# Patient Record
Sex: FEMALE | Race: OTHER | NOT HISPANIC OR LATINO
[De-identification: names, ages, dates, MRNs, and addresses within clinical notes are randomized per-mention and may not be internally consistent; named-entity substitution may affect disease eponyms.]

---

## 2018-11-21 ENCOUNTER — APPOINTMENT (OUTPATIENT)
Dept: ORTHOPEDIC SURGERY | Facility: CLINIC | Age: 23
End: 2018-11-21
Payer: COMMERCIAL

## 2018-11-21 VITALS
BODY MASS INDEX: 30.56 KG/M2 | HEIGHT: 64 IN | SYSTOLIC BLOOD PRESSURE: 123 MMHG | DIASTOLIC BLOOD PRESSURE: 85 MMHG | HEART RATE: 68 BPM | WEIGHT: 179 LBS

## 2018-11-21 DIAGNOSIS — M21.6X2 OTHER ACQUIRED DEFORMITIES OF LEFT FOOT: ICD-10-CM

## 2018-11-21 DIAGNOSIS — Q74.2 OTHER CONGENITAL MALFORMATIONS OF LOWER LIMB(S), INCLUDING PELVIC GIRDLE: ICD-10-CM

## 2018-11-21 DIAGNOSIS — Z78.9 OTHER SPECIFIED HEALTH STATUS: ICD-10-CM

## 2018-11-21 PROCEDURE — 99203 OFFICE O/P NEW LOW 30 MIN: CPT

## 2018-11-21 PROCEDURE — 73630 X-RAY EXAM OF FOOT: CPT | Mod: LT

## 2019-06-11 ENCOUNTER — RESULT REVIEW (OUTPATIENT)
Age: 24
End: 2019-06-11

## 2020-03-22 ENCOUNTER — TRANSCRIPTION ENCOUNTER (OUTPATIENT)
Age: 25
End: 2020-03-22

## 2020-07-16 ENCOUNTER — RESULT REVIEW (OUTPATIENT)
Age: 25
End: 2020-07-16

## 2020-07-16 ENCOUNTER — TRANSCRIPTION ENCOUNTER (OUTPATIENT)
Age: 25
End: 2020-07-16

## 2021-01-31 ENCOUNTER — TRANSCRIPTION ENCOUNTER (OUTPATIENT)
Age: 26
End: 2021-01-31

## 2021-02-21 ENCOUNTER — TRANSCRIPTION ENCOUNTER (OUTPATIENT)
Age: 26
End: 2021-02-21

## 2021-04-05 ENCOUNTER — TRANSCRIPTION ENCOUNTER (OUTPATIENT)
Age: 26
End: 2021-04-05

## 2021-07-21 ENCOUNTER — TRANSCRIPTION ENCOUNTER (OUTPATIENT)
Age: 26
End: 2021-07-21

## 2022-03-01 ENCOUNTER — APPOINTMENT (OUTPATIENT)
Dept: ORTHOPEDIC SURGERY | Facility: CLINIC | Age: 27
End: 2022-03-01
Payer: COMMERCIAL

## 2022-03-01 PROCEDURE — 99203 OFFICE O/P NEW LOW 30 MIN: CPT

## 2022-03-01 PROCEDURE — 73562 X-RAY EXAM OF KNEE 3: CPT | Mod: 26,LT

## 2022-03-26 ENCOUNTER — TRANSCRIPTION ENCOUNTER (OUTPATIENT)
Age: 27
End: 2022-03-26

## 2022-04-18 ENCOUNTER — APPOINTMENT (OUTPATIENT)
Dept: MRI IMAGING | Facility: CLINIC | Age: 27
End: 2022-04-18

## 2022-04-29 ENCOUNTER — APPOINTMENT (OUTPATIENT)
Dept: ORTHOPEDIC SURGERY | Facility: CLINIC | Age: 27
End: 2022-04-29
Payer: COMMERCIAL

## 2022-04-29 VITALS
BODY MASS INDEX: 30.73 KG/M2 | SYSTOLIC BLOOD PRESSURE: 137 MMHG | HEART RATE: 71 BPM | HEIGHT: 64 IN | DIASTOLIC BLOOD PRESSURE: 91 MMHG | WEIGHT: 180 LBS

## 2022-04-29 PROCEDURE — 99213 OFFICE O/P EST LOW 20 MIN: CPT

## 2022-04-29 NOTE — PHYSICAL EXAM
[LE] : Sensory: Intact in bilateral lower extremities [Knee] : patellar 2+ and symmetric bilaterally [PT] : posterior tibial 2+ and symmetric bilaterally [de-identified] : Pt is a pleasant 26 year old female in NAD and AAOx3. 30 BMI. The pt has a mildly antalgic gait. Physical examination of both knees reveals normal contours, skin intact with no signs of infection, no erythema, no swelling, no effusion, no distal lymphedema or phlebitis, no patholaxity. ROM of the Left knee reveals 0°-120° Some discomfort with terminal flexion. Strength is 5/5 within this arc of motion. There is no pain on palpation to the medial/lateral joint line. There are no neurological deficits.+ Anterior drawer test. Negative Lachman. Negative Pivot shift\par \par KT-1000\par Right knee 8.5mm\par Left knee 12mm [de-identified] : Xray ([facility] PACS 3/1/22): interpreted by me today: Three views of the left knee demonstrate no acute fracture or dislocation.\par \par MRI L knee ([facility] 4/21/22): Partial tear of the ACL, midsubstance

## 2022-04-29 NOTE — HISTORY OF PRESENT ILLNESS
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 She was seen by Mr Irvin Samuels and had x-rays and MRI done. She was advised to follow up with a surgeon and is here today for consult. She states that the pain is a constant 2/10, which she does not take any pain meds. The pain does keep her from sleeping well. She states the knee does buckle on her randomly. She has a different gait due to the pain in the knee and she favors the right leg. She denies any numbness or tingling.

## 2022-04-29 NOTE — CONSULT LETTER
[Dear  ___] : Dear  [unfilled], [DrClau  ___] : Dr. ESCAMILLA [FreeTextEntry1] : I had the pleasure of evaluating your patient in the office today for complaints of left knee pain secondary to partial ACL tear. I have enclosed a copy of today's office notes for your charts and for your review. The patient will need documentation of medical clearance if she chooses to pursue surgical intervention. \par \par Sincerely, \par \par Enrique Payne M.D.\par Professor and \par Department of Orthopedic Surgery\par [facility] [place]\par

## 2022-04-29 NOTE — REASON FOR VISIT
[Initial Visit] : an initial visit for [FreeTextEntry2] : Left knee pain. Referred by Dr. Ramsey Medellin

## 2022-04-29 NOTE — DISCUSSION/SUMMARY
[Surgical risks reviewed] : Surgical risks reviewed [de-identified] : Pt is a pleasant 26 year old female with left knee pain secondary to partial thickness ACL tear.  A lengthy discussion was held regarding the patients condition and treatment options including all risks, benefits, prognosis and outcomes of each were discussed in detail. The patient is to discuss surgery with her family. She was given a physical therapy prescription at today's visit. The patient will contact me if there are any concerns. She will call the office with her decision of whether or not she wants to pursue surgery. The patient expressed understanding and all questions were answered.\par

## 2022-09-12 ENCOUNTER — NON-APPOINTMENT (OUTPATIENT)
Age: 27
End: 2022-09-12

## 2022-09-12 ENCOUNTER — APPOINTMENT (OUTPATIENT)
Dept: PULMONOLOGY | Facility: CLINIC | Age: 27
End: 2022-09-12

## 2022-09-12 DIAGNOSIS — Z88.9 ALLERGY STATUS TO UNSPECIFIED DRUGS, MEDICAMENTS AND BIOLOGICAL SUBSTANCES: ICD-10-CM

## 2022-09-12 DIAGNOSIS — Z82.5 FAMILY HISTORY OF ASTHMA AND OTHER CHRONIC LOWER RESPIRATORY DISEASES: ICD-10-CM

## 2022-09-12 DIAGNOSIS — M21.42 FLAT FOOT [PES PLANUS] (ACQUIRED), LEFT FOOT: ICD-10-CM

## 2022-09-12 DIAGNOSIS — Z83.3 FAMILY HISTORY OF DIABETES MELLITUS: ICD-10-CM

## 2022-09-12 DIAGNOSIS — Z82.49 FAMILY HISTORY OF ISCHEMIC HEART DISEASE AND OTHER DISEASES OF THE CIRCULATORY SYSTEM: ICD-10-CM

## 2022-09-12 DIAGNOSIS — Z87.2 PERSONAL HISTORY OF DISEASES OF THE SKIN AND SUBCUTANEOUS TISSUE: ICD-10-CM

## 2022-09-12 DIAGNOSIS — U07.1 COVID-19: ICD-10-CM

## 2022-09-12 PROCEDURE — 71046 X-RAY EXAM CHEST 2 VIEWS: CPT

## 2022-09-12 PROCEDURE — 94010 BREATHING CAPACITY TEST: CPT

## 2022-09-12 PROCEDURE — 99204 OFFICE O/P NEW MOD 45 MIN: CPT | Mod: 25

## 2022-09-12 PROCEDURE — 94618 PULMONARY STRESS TESTING: CPT

## 2022-09-12 RX ORDER — OLOPATADINE HYDROCHLORIDE 665 UG/1
0.6 SPRAY, METERED NASAL
Qty: 3 | Refills: 0 | Status: ACTIVE | COMMUNITY
Start: 2022-09-12 | End: 1900-01-01

## 2022-09-12 RX ORDER — OLOPATADINE HYDROCHLORIDE 2 MG/ML
0.2 SOLUTION OPHTHALMIC
Qty: 3 | Refills: 1 | Status: ACTIVE | COMMUNITY
Start: 2022-09-12 | End: 1900-01-01

## 2022-09-13 NOTE — REASON FOR VISIT
[Initial] : an initial visit [TextBox_44] : SOB, likely mild and intermittent asthma , allergies, eczema

## 2022-09-13 NOTE — PROCEDURE
[FreeTextEntry1] : CXR reveals a normal sized heart; no evidence of infiltrate or effusion--a normal appearing chest radiograph \par \par PFT reveals normal flows, with an FEV1 of 2.90 L, which is 105% of predicted, with a normal flow volume loop. \par \par 6 minute walk test reveals a low saturation of 96% with very slight dyspnea or fatigue; walked 635.7 meters. Pt stated mild SOB\par \par FENO was 18; a normal value being less than 25\par Fractional exhaled nitric oxide (FENO) is regarded as a simple, noninvasive method for assessing eosinophilic airway inflammation. Produced by a variety of cells within the lung, nitric oxide (NO) concentrations are generally low in healthy individuals. However, high concentrations of NO appear to be involved in nonspecific host defense mechanisms and chronic inflammatory diseases such as asthma. The American Thoracic Society (ATS) therefore has recommended using FENO to aid in the diagnosis and monitoring of eosinophilic airway inflammation and asthma, and for identifying steroid responsive individuals whose chronic respiratory symptoms may be caused by airway inflammation.

## 2022-09-13 NOTE — HISTORY OF PRESENT ILLNESS
[TextBox_4] : Ms. LEOS  is a 27 year old female with a history of ACL tear, allergies, Covid 19, eczema, who now comes to the office for an initial pulmonary evaluation. Her chief complaint is\par \par -she notes allergy symptoms are itchy eyes, productive cough and nasal congestion \par -she notes mild eczema \par -she notes nasal congestion in the summer\par -she notes currently on allergy shots \par -she notes intermittent SOB due to nasal congestion \par -she notes intermittent chest pain and congestion due to coughing\par -she notes bowels are good and regular   \par -she notes weight is stable  \par -she notes snoring onset 2 years \par  -she denies restful sleep\par -she notes she is able to fall asleep while watching TV\par -she notes her memory is stable \par -she notes concentration is below baseline\par -she notes she is able to fall asleep as a passenger in car \par -she notes bowels are good and regular \par -she notes vision is stable \par -she notes vegetarian \par -she notes mild upper back pain \par -she notes globus \par -she denies epistaxis \par \par -she denies any arthralgias, chest pain, chest pressure, chills, constipation, diarrhea, dizziness, dysphagia, fever, headaches, heartburn, itchy eyes, itchy ears, leg swelling, leg pain, myalgias, nausea, reflux, sweats, vomiting, wheezing or sour taste in the mouth.

## 2022-09-13 NOTE — PHYSICAL EXAM
[No Acute Distress] : no acute distress [Normal Oropharynx] : normal oropharynx [III] : Mallampati Class: III [Normal Appearance] : normal appearance [No Neck Mass] : no neck mass [Normal Rate/Rhythm] : normal rate/rhythm [Normal S1, S2] : normal s1, s2 [No Murmurs] : no murmurs [No Resp Distress] : no resp distress [Clear to Auscultation Bilaterally] : clear to auscultation bilaterally [No Abnormalities] : no abnormalities [Benign] : benign [Normal Gait] : normal gait [No Clubbing] : no clubbing [No Cyanosis] : no cyanosis [No Edema] : no edema [FROM] : FROM [Normal Color/ Pigmentation] : normal color/ pigmentation [No Focal Deficits] : no focal deficits [Oriented x3] : oriented x3 [Normal Affect] : normal affect [TextBox_2] : ow [TextBox_54] : 2/6 systolic murmur  [TextBox_68] : I:E ratio 1:3; clear

## 2022-09-13 NOTE — ASSESSMENT
[FreeTextEntry1] : Ms. LEOS  is a 27 year old female with a history of ACL tear, allergies, Covid 19, eczema, who now comes to the office for an initial pulmonary evaluation for SOB, likely mild and intermittent asthma , allergies, eczema \par \par Her shortness of breath is multifactorial due to:\par -poor mechanics of breathing \par -out of shape \par -over weight \par -pulmonary disease\par   -mild and intermittent asthma \par -cardiac disease \par \par problem 1:\par -add Symbicort 160 2 inhalations BID \par Asthma is believed to be caused by inherited (genetic) and environmental factor, but its exact cause is unknown. Asthma may be triggered by allergens, lung infections, or irritants in the air. Asthma triggers are different for each person. \par -Inhaler technique reviewed as well as oral hygiene techniques reviewed with patient. Avoidance of cold air, extremes of temperature; rescue inhaler should be used before exercise. Order of medication reviewed with patient. Recommended use of a cool mist humidifier in the bedroom. \par \par problem 2: allergies/ sinus\par -add Olopatadine 0.6% 1 sniff BID \par -add Olopatadine 0.2% eye drops, one drop in each eye BID \par -Environmental measures for allergies were encouraged including mattress and pillow covers, air purifier, and environmental controls. \par \par Problem 3:eczema\par -?dupixent candidate \par -complete blood work to include: asthma panel, food IgE panel, IgE level, eosinophil level, vitamin D level \par Dupixent is a prescription medicine used with other asthma medicines for the maintenance treatment of moderate-to-severe asthma in people aged 12 years and older whose asthma is not controlled with their current asthma medicines. Dupixent helps prevent severe asthma attacks (exacerbations) and can improve your breathing. Dupixent may also help reduce the amount of oral corticosteroids you need while preventing severe asthma attacks and improving your breathing. Dupixent is not used to treat sudden breathing problems. Risks and side effects of Dupixent were discussed and reviewed with patient. \par \par Problem 4: r/o KAVYA\par -complete home sleep study \par Sleep apnea is associated with adverse clinical consequences which can affect most organ systems.\par Cardiovascular disease risk includes arrhythmias, atrial fibrillation, hypertension, coronary artery disease, and stroke. Metabolic disorders include diabetes type 2, non-alcoholic fatty liver disease. Mood disorder especially depression; and cognitive decline especially in the elderly. Associations with chronic reflux/Rubio’s esophagus some but not all inclusive.\par -Reasons include arousal consistent with hypopnea; respiratory events most prominent in REM sleep or supine position; therefore sleep staging and body position are important for accurate diagnosis and estimation of AHI. \par \par problem 5: cardiac disease\par -recommended to continue to follow up with Cardiologist \par \par problem 6: poor breathing mechanics\par -Proper breathing techniques were reviewed with an emphasis of exhalation. Patient instructed to breath in for 1 second and out for four seconds. Patient was encouraged to not talk while walking. \par \par problem 7:  overweight/ out of shape\par -recommended "10-Day Detox Diet" by Dr. Albino Weber \par -Weight loss, exercise, and diet control were discussed and are highly encouraged. Treatment options are given such as, aqua therapy, and contacting a nutritionist. Recommended to use the elliptical, stationary bike, less use of treadmill. \par \par problem 8: health maintenance \par -s/p COVID-19 vaccine x3 \par -Recommended not to get an additional COVID-19 booster at this time until it is updated against the current variants. If planning on travelling, obtaining another booster within 2 weeks of departure is recommended. \par -recommended yearly flu shot after October 15\par -recommended strep pneumonia vaccines: Prevnar-13 vaccine, followed by Pneumo vaccine 23 one year following after 65 years old. \par -recommended early intervention for Upper Respiratory Infections (URIs)\par -recommended regular osteoporosis evaluations\par -recommended early dermatological evaluations\par -recommended after the age of 50 to the age of 70, colonoscopy every 5 years\par \par F/U in 6-8 weeks.\par She is encouraged to call with any changes, concerns, or questions

## 2022-09-13 NOTE — ADDENDUM
[FreeTextEntry1] : **Amended by Jhonatan Hearn acting as a scribe for Dr. Laureano Mayer on 09/13/2022** \par \par Documented by Phuong Dupont acting as a scribe for Dr. Laureano Mayer on 09/12/2022 .\par \par All medical record entries made by the Scribe were at my, Dr. Laureano Mayer's, direction and personally dictated by me on 09/12/2022. I have reviewed the chart and agree that the record accurately reflects my personal performance of the history, physical exam, assessment and plan. I have also personally directed, reviewed, and agree with the discharge instructions.

## 2022-10-05 ENCOUNTER — NON-APPOINTMENT (OUTPATIENT)
Age: 27
End: 2022-10-05

## 2022-11-14 ENCOUNTER — OUTPATIENT (OUTPATIENT)
Dept: OUTPATIENT SERVICES | Facility: HOSPITAL | Age: 27
LOS: 1 days | End: 2022-11-14
Payer: COMMERCIAL

## 2022-11-14 ENCOUNTER — APPOINTMENT (OUTPATIENT)
Dept: SLEEP CENTER | Facility: CLINIC | Age: 27
End: 2022-11-14

## 2022-11-14 PROCEDURE — 95800 SLP STDY UNATTENDED: CPT

## 2022-11-14 PROCEDURE — 95800 SLP STDY UNATTENDED: CPT | Mod: 26

## 2022-11-22 ENCOUNTER — APPOINTMENT (OUTPATIENT)
Dept: PULMONOLOGY | Facility: CLINIC | Age: 27
End: 2022-11-22

## 2022-11-22 VITALS
SYSTOLIC BLOOD PRESSURE: 120 MMHG | DIASTOLIC BLOOD PRESSURE: 74 MMHG | BODY MASS INDEX: 30.73 KG/M2 | WEIGHT: 180 LBS | HEART RATE: 77 BPM | HEIGHT: 64 IN | TEMPERATURE: 97.8 F | RESPIRATION RATE: 16 BRPM | OXYGEN SATURATION: 98 %

## 2022-11-22 PROCEDURE — 94729 DIFFUSING CAPACITY: CPT

## 2022-11-22 PROCEDURE — 90682 RIV4 VACC RECOMBINANT DNA IM: CPT

## 2022-11-22 PROCEDURE — ZZZZZ: CPT

## 2022-11-22 PROCEDURE — 94010 BREATHING CAPACITY TEST: CPT

## 2022-11-22 PROCEDURE — 99214 OFFICE O/P EST MOD 30 MIN: CPT | Mod: 25

## 2022-11-22 PROCEDURE — 94727 GAS DIL/WSHOT DETER LNG VOL: CPT

## 2022-11-22 PROCEDURE — 95012 NITRIC OXIDE EXP GAS DETER: CPT

## 2022-11-22 PROCEDURE — G0008: CPT

## 2022-11-22 NOTE — REASON FOR VISIT
[Follow-Up] : a follow-up visit [TextBox_44] : SOB, likely mild and intermittent asthma , allergies, eczema , (+) mild KAVYA

## 2022-11-22 NOTE — ADDENDUM
[FreeTextEntry1] : Documented by STELLA Seo acting as a scribe for Dr. Laureano Mayer on 11/22/2022 .\par \par All medical record entries made by the Scribe were at my, Dr. Laureano Mayer's, direction and personally dictated by me on 11/22/2022. I have reviewed the chart and agree that the record accurately reflects my personal performance of the history, physical exam, assessment and plan. I have also personally directed, reviewed, and agree with the discharge instructions.

## 2022-11-22 NOTE — ASSESSMENT
[FreeTextEntry1] : Ms. LEOS  is a 27 year old female with a history of ACL tear, allergies, Covid 19, eczema, who now comes to the office for a follow up pulmonary evaluation for SOB, likely mild and intermittent asthma , allergies, eczema, (+) mild KAVYA\par \par Her shortness of breath is multifactorial due to:\par -poor mechanics of breathing \par -out of shape \par -over weight \par -pulmonary disease\par   -mild intermittent asthma \par -cardiac disease \par \par problem 1: mild intermittent asthma \par -add Ventolin 2 puffs Q6H, pre-exercise \par -continue Symbicort 160 2 inhalations BID \par Asthma is believed to be caused by inherited (genetic) and environmental factor, but its exact cause is unknown. Asthma may be triggered by allergens, lung infections, or irritants in the air. Asthma triggers are different for each person. \par -Inhaler technique reviewed as well as oral hygiene techniques reviewed with patient. Avoidance of cold air, extremes of temperature; rescue inhaler should be used before exercise. Order of medication reviewed with patient. Recommended use of a cool mist humidifier in the bedroom. \par \par problem 2: allergies/ sinus\par -add Olopatadine 0.6% 1 sniff BID \par -add Olopatadine 0.2% eye drops, one drop in each eye BID \par -Environmental measures for allergies were encouraged including mattress and pillow covers, air purifier, and environmental controls. \par \par Problem 3:eczema\par -?dupixent candidate \par -complete blood work to include: asthma panel, food IgE panel, IgE level, eosinophil level, vitamin D level \par Dupixent is a prescription medicine used with other asthma medicines for the maintenance treatment of moderate-to-severe asthma in people aged 12 years and older whose asthma is not controlled with their current asthma medicines. Dupixent helps prevent severe asthma attacks (exacerbations) and can improve your breathing. Dupixent may also help reduce the amount of oral corticosteroids you need while preventing severe asthma attacks and improving your breathing. Dupixent is not used to treat sudden breathing problems. Risks and side effects of Dupixent were discussed and reviewed with patient. \par \par Problem 4: mild KAVYA\par -s/p home sleep study - mild KAVYA\par   -recommended DD (Chetan Bonner)\par Sleep apnea is associated with adverse clinical consequences which can affect most organ systems.\par Cardiovascular disease risk includes arrhythmias, atrial fibrillation, hypertension, coronary artery disease, and stroke. Metabolic disorders include diabetes type 2, non-alcoholic fatty liver disease. Mood disorder especially depression; and cognitive decline especially in the elderly. Associations with chronic reflux/Rubio’s esophagus some but not all inclusive.\par -Reasons include arousal consistent with hypopnea; respiratory events most prominent in REM sleep or supine position; therefore sleep staging and body position are important for accurate diagnosis and estimation of AHI. \par \par problem 5: cardiac disease\par -recommended to continue to follow up with Cardiologist if needed\par \par problem 6: poor breathing mechanics\par -Proper breathing techniques were reviewed with an emphasis of exhalation. Patient instructed to breath in for 1 second and out for four seconds. Patient was encouraged to not talk while walking. \par \par problem 7:  overweight/ out of shape\par -recommended consultation with Dr. Tiffanie Paz and Dr. Homer Griffin \par -recommended "10-Day Detox Diet" by Dr. Albino Weber \par -Weight loss, exercise, and diet control were discussed and are highly encouraged. Treatment options are given such as, aqua therapy, and contacting a nutritionist. Recommended to use the elliptical, stationary bike, less use of treadmill. \par \par problem 8: health maintenance \par -s/p COVID-19 vaccine x3 \par -Recommended not to get an additional COVID-19 booster at this time until it is updated against the current variants. If planning on travelling, obtaining another booster within 2 weeks of departure is recommended. \par -recommended yearly flu shot after October 15 (s/p 2022)\par -recommended strep pneumonia vaccines: Prevnar-13 vaccine, followed by Pneumo vaccine 23 one year following after 65 years old. \par -recommended early intervention for Upper Respiratory Infections (URIs)\par -recommended regular osteoporosis evaluations\par -recommended early dermatological evaluations\par -recommended after the age of 50 to the age of 70, colonoscopy every 5 years\par \par F/U in 6-8 weeks.\par She is encouraged to call with any changes, concerns, or questions

## 2022-11-22 NOTE — PROCEDURE
[FreeTextEntry1] : Full PFT reveals normal flows; FEV1 was 3.00L which is 97% of predicted; normal lung volumes; normal diffusion at 24.4, which is 92% of predicted; normal flow volume loop. \par \par FENO was 38; a normal value being less than 25\par Fractional exhaled nitric oxide (FENO) is regarded as a simple, noninvasive method for assessing eosinophilic airway inflammation. Produced by a variety of cells within the lung, nitric oxide (NO) concentrations are generally low in healthy individuals. However, high concentrations of NO appear to be involved in nonspecific host defense mechanisms and chronic inflammatory diseases such as asthma. The American Thoracic Society (ATS) therefore has recommended using FENO to aid in the diagnosis and monitoring of eosinophilic airway inflammation and asthma, and for identifying steroid responsive individuals whose chronic respiratory symptoms may be caused by airway inflammation.

## 2022-11-29 ENCOUNTER — APPOINTMENT (OUTPATIENT)
Dept: ORTHOPEDIC SURGERY | Facility: CLINIC | Age: 27
End: 2022-11-29

## 2022-11-29 DIAGNOSIS — M22.2X2 PATELLOFEMORAL DISORDERS, LEFT KNEE: ICD-10-CM

## 2022-11-29 PROCEDURE — 99213 OFFICE O/P EST LOW 20 MIN: CPT

## 2022-11-29 NOTE — CONSULT LETTER
[Dear  ___] : Dear  [unfilled], [DrClau  ___] : Dr. ESCAMILLA [FreeTextEntry1] : I had the pleasure of evaluating your patient in the office today for complaints of left knee pain secondary to patellofemoral syndrome. I have enclosed a copy of today's office notes for your charts and for your review. The patient will need documentation of medical clearance if she chooses to pursue surgical intervention. \par \par Sincerely, \par \par Enrique Payne M.D.\par Professor and \par Department of Orthopedic Surgery\par [facility] [place]\par

## 2022-11-29 NOTE — PHYSICAL EXAM
[LE] : Sensory: Intact in bilateral lower extremities [Knee] : patellar 2+ and symmetric bilaterally [PT] : posterior tibial 2+ and symmetric bilaterally [de-identified] : Pt is a pleasant 27 year old female in NAD and AAOx3. 30 BMI. The pt has a non antalgic gait. Physical examination of both knees reveals normal contours, skin intact with no signs of infection, no erythema, no swelling, no effusion, no distal lymphedema or phlebitis, no patholaxity. ROM of the Left knee reveals 0°-140° Some discomfort with terminal flexion and retropatellar pain. Strength is 5/5 within this arc of motion. There is no pain on palpation to the medial/lateral joint line. There are no neurological deficits. - Anterior drawer test with firm endpoint. 1+ Lachman. Negative Pivot shift\par \par  [de-identified] : Xray ([facility] PACS 3/1/22): reviewed by me today: Three views of the left knee demonstrate no acute fracture or dislocation. Normal patellar height. \par \par MRI L knee ([facility] 4/21/22): Partial tear of the ACL, midsubstance

## 2022-11-29 NOTE — DISCUSSION/SUMMARY
[Surgical risks reviewed] : Surgical risks reviewed [de-identified] : Pt is a pleasant 27 year old female with left  knee pain in the setting of previously diagnosed partial ACL tear .  A lengthy discussion was held regarding the patients condition and treatment options including all risks, benefits, prognosis and outcomes of each were discussed in detail. The patient was advised her clinical exam and complaints are more consistent with patellofemoral pain. A detailed handout regarding her diagnosis with a list of conservative therapy options were provided. With regards to her ACL, clinically this does not seem to be causing her any symptoms of instability. Regarding her upcoming ski trip, it was advised that she should undergo pre-conditioning to avoid further injury. A prescription for physical therapy was provided with specific patella protocol as well as hip/core/back conditioning program. The patient would like to continue with conservative management at this time.  Follow up will be prn. The patient expressed understanding and all questions were answered.

## 2022-11-29 NOTE — HISTORY OF PRESENT ILLNESS
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 She was seen in the past with a subsequent MRI demonstrating a partial ACL tear. She was treated conservatively with physical therapy, but now complains of intermittent knee pain, particularly with climbing or descending stairs. She states the pain is a 3/10 at this visit. She denies any symptoms of instability. She is planning on going on a ski trip this February and would like to know if her prior partial ACL tear would prevent her from doing so.She denies any numbness or tingling.

## 2022-12-13 DIAGNOSIS — G47.33 OBSTRUCTIVE SLEEP APNEA (ADULT) (PEDIATRIC): ICD-10-CM

## 2023-01-18 ENCOUNTER — NON-APPOINTMENT (OUTPATIENT)
Age: 28
End: 2023-01-18

## 2023-01-20 ENCOUNTER — NON-APPOINTMENT (OUTPATIENT)
Age: 28
End: 2023-01-20

## 2023-03-20 ENCOUNTER — NON-APPOINTMENT (OUTPATIENT)
Age: 28
End: 2023-03-20

## 2023-03-20 ENCOUNTER — APPOINTMENT (OUTPATIENT)
Dept: INTERNAL MEDICINE | Facility: CLINIC | Age: 28
End: 2023-03-20
Payer: COMMERCIAL

## 2023-03-20 VITALS
SYSTOLIC BLOOD PRESSURE: 136 MMHG | DIASTOLIC BLOOD PRESSURE: 98 MMHG | OXYGEN SATURATION: 99 % | HEART RATE: 88 BPM | TEMPERATURE: 98.1 F | HEIGHT: 64 IN | BODY MASS INDEX: 33.12 KG/M2 | WEIGHT: 194 LBS

## 2023-03-20 DIAGNOSIS — Z78.9 OTHER SPECIFIED HEALTH STATUS: ICD-10-CM

## 2023-03-20 DIAGNOSIS — S83.519A SPRAIN OF ANTERIOR CRUCIATE LIGAMENT OF UNSPECIFIED KNEE, INITIAL ENCOUNTER: ICD-10-CM

## 2023-03-20 DIAGNOSIS — M79.672 PAIN IN LEFT FOOT: ICD-10-CM

## 2023-03-20 DIAGNOSIS — R40.0 SOMNOLENCE: ICD-10-CM

## 2023-03-20 DIAGNOSIS — Z23 ENCOUNTER FOR IMMUNIZATION: ICD-10-CM

## 2023-03-20 DIAGNOSIS — N92.0 EXCESSIVE AND FREQUENT MENSTRUATION WITH REGULAR CYCLE: ICD-10-CM

## 2023-03-20 DIAGNOSIS — E66.3 OVERWEIGHT: ICD-10-CM

## 2023-03-20 DIAGNOSIS — H54.7 UNSPECIFIED VISUAL LOSS: ICD-10-CM

## 2023-03-20 PROCEDURE — 99385 PREV VISIT NEW AGE 18-39: CPT | Mod: 25

## 2023-03-20 PROCEDURE — 99204 OFFICE O/P NEW MOD 45 MIN: CPT | Mod: 25

## 2023-03-20 PROCEDURE — 93000 ELECTROCARDIOGRAM COMPLETE: CPT

## 2023-03-20 RX ORDER — CHOLECALCIFEROL (VITAMIN D3) 25 MCG
TABLET ORAL
Refills: 0 | Status: ACTIVE | COMMUNITY

## 2023-03-20 RX ORDER — TRIAMCINOLONE ACETONIDE 1 MG/G
0.1 CREAM TOPICAL TWICE DAILY
Qty: 1 | Refills: 1 | Status: ACTIVE | COMMUNITY
Start: 2023-03-20 | End: 1900-01-01

## 2023-03-20 RX ORDER — VITAMIN B COMPLEX
333-133-5 TABLET ORAL
Refills: 0 | Status: ACTIVE | COMMUNITY

## 2023-03-20 RX ORDER — MELOXICAM 15 MG/1
15 TABLET ORAL
Qty: 60 | Refills: 1 | Status: DISCONTINUED | COMMUNITY
Start: 2022-03-01 | End: 2023-03-20

## 2023-03-20 RX ORDER — BUDESONIDE AND FORMOTEROL FUMARATE DIHYDRATE 160; 4.5 UG/1; UG/1
160-4.5 AEROSOL RESPIRATORY (INHALATION) TWICE DAILY
Qty: 1 | Refills: 3 | Status: DISCONTINUED | COMMUNITY
Start: 2022-09-12 | End: 2023-03-20

## 2023-04-06 ENCOUNTER — NON-APPOINTMENT (OUTPATIENT)
Age: 28
End: 2023-04-06

## 2023-05-16 ENCOUNTER — APPOINTMENT (OUTPATIENT)
Dept: ORTHOPEDIC SURGERY | Facility: CLINIC | Age: 28
End: 2023-05-16
Payer: COMMERCIAL

## 2023-05-16 DIAGNOSIS — M17.12 UNILATERAL PRIMARY OSTEOARTHRITIS, LEFT KNEE: ICD-10-CM

## 2023-05-16 DIAGNOSIS — S83.512A SPRAIN OF ANTERIOR CRUCIATE LIGAMENT OF LEFT KNEE, INITIAL ENCOUNTER: ICD-10-CM

## 2023-05-16 PROCEDURE — 99213 OFFICE O/P EST LOW 20 MIN: CPT

## 2023-05-16 NOTE — CONSULT LETTER
[Dear  ___] : Dear  [unfilled], [DrClau  ___] : Dr. ESCAMILLA [FreeTextEntry1] : I had the pleasure of evaluating your patient in the office today for complaints of left knee pain secondary to patellofemoral syndrome. I have enclosed a copy of today's office notes for your charts and for your review.\par \par Sincerely, \par \par Enrique Payne M.D.\par Professor and \par Department of Orthopedic Surgery\[facility] [place]\par

## 2023-05-16 NOTE — PHYSICAL EXAM
[LE] : Sensory: Intact in bilateral lower extremities [Knee] : patellar 2+ and symmetric bilaterally [PT] : posterior tibial 2+ and symmetric bilaterally [de-identified] : Pt is a pleasant 27 year old female in NAD and AAOx3. 30 BMI. The pt has a non antalgic gait. Physical examination of Left knee reveals normal contours, skin intact with no signs of infection, no erythema, no swelling, no effusion, no distal lymphedema or phlebitis, no patholaxity. ROM of the Left knee reveals 0°-140°. + lateral patella TTP/ + patella tendon pain. Strength is 5/5 within this arc of motion. There is no pain on palpation to the medial/lateral joint line. There are no neurological deficits. - Anterior drawer test with firm endpoint. 1+ Lachman. Negative Pivot shift\par \par  [de-identified] : Xray ([facility] PACS 3/1/22): reviewed by me today: Three views of the left knee demonstrate no acute fracture or dislocation. Normal patellar height. \par \par MRI L knee ([facility] 4/21/22): Partial tear of the ACL, midsubstance

## 2023-05-16 NOTE — HISTORY OF PRESENT ILLNESS
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 She was seen in the past with a subsequent MRI demonstrating a partial ACL tear. She still has continued pain after conservative treatment. She states that she has been doing HIT exercises and started to notice pain in front of her knee. She states the pain is a 0/10 at this visit but increases going up with stairs. She was able to go skiing in 2/2023 without any incident but she did have pain during the first few days. She denies any numbness or tingling.\par \par Hx: None

## 2023-05-16 NOTE — DISCUSSION/SUMMARY
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  Xray and MRI imaging reviewed with patient at visit today. A lengthy discussion was held regarding the patients condition and treatment options including all risks, benefits, prognosis and outcomes of each were discussed in detail. The patient was advised her clinical exam and complaints are more consistent with patellofemoral pain. A detailed handout regarding her diagnosis with a list of conservative therapy options were provided. With regards to her ACL, clinically this does not seem to be causing her any symptoms of instability. The patient may continue home exercise, recommend bracing with patella cut out/stabilization, ice and isometric exercises. The patient would like to continue with conservative management at this time.  Follow up will be prn. The patient expressed understanding and all questions were answered.

## 2023-06-21 ENCOUNTER — NON-APPOINTMENT (OUTPATIENT)
Age: 28
End: 2023-06-21

## 2023-06-21 ENCOUNTER — APPOINTMENT (OUTPATIENT)
Dept: PULMONOLOGY | Facility: CLINIC | Age: 28
End: 2023-06-21
Payer: COMMERCIAL

## 2023-06-21 VITALS
OXYGEN SATURATION: 96 % | TEMPERATURE: 97.6 F | RESPIRATION RATE: 16 BRPM | BODY MASS INDEX: 31.41 KG/M2 | HEART RATE: 67 BPM | HEIGHT: 64 IN | WEIGHT: 184 LBS | SYSTOLIC BLOOD PRESSURE: 120 MMHG | DIASTOLIC BLOOD PRESSURE: 82 MMHG

## 2023-06-21 DIAGNOSIS — E55.9 VITAMIN D DEFICIENCY, UNSPECIFIED: ICD-10-CM

## 2023-06-21 DIAGNOSIS — R06.02 SHORTNESS OF BREATH: ICD-10-CM

## 2023-06-21 DIAGNOSIS — R06.83 SNORING: ICD-10-CM

## 2023-06-21 PROCEDURE — 99214 OFFICE O/P EST MOD 30 MIN: CPT | Mod: 25

## 2023-06-21 PROCEDURE — 95012 NITRIC OXIDE EXP GAS DETER: CPT

## 2023-06-21 PROCEDURE — 94010 BREATHING CAPACITY TEST: CPT

## 2023-06-21 NOTE — ASSESSMENT
[FreeTextEntry1] : Ms. LEOS  is a 28 year old female with a history of ACL tear, allergies, Covid 19, eczema, who now comes to the office for a follow up pulmonary evaluation for SOB, likely mild and intermittent asthma , allergies, eczema, (+) mild KAVYA (NC w/ Rx), significant eczema \par \par Her shortness of breath is multifactorial due to:\par -poor mechanics of breathing \par -out of shape \par -over weight \par -pulmonary disease\par   -mild intermittent asthma \par -cardiac disease \par \par problem 1: mild intermittent asthma - controlled \par -add Ventolin 2 puffs Q6H, pre-exercise \par -continue Symbicort 160 2 inhalations BID \par Asthma is believed to be caused by inherited (genetic) and environmental factor, but its exact cause is unknown. Asthma may be triggered by allergens, lung infections, or irritants in the air. Asthma triggers are different for each person. \par -Inhaler technique reviewed as well as oral hygiene techniques reviewed with patient. Avoidance of cold air, extremes of temperature; rescue inhaler should be used before exercise. Order of medication reviewed with patient. Recommended use of a cool mist humidifier in the bedroom. \par \par problem 2: allergies/ sinus\par -add Olopatadine 0.6% 1 sniff BID \par -add Olopatadine 0.2% eye drops, one drop in each eye BID \par -Environmental measures for allergies were encouraged including mattress and pillow covers, air purifier, and environmental controls. \par \par Problem 3:eczema\par -?dupixent candidate \par -complete blood work to include: asthma panel, food IgE panel, IgE level, eosinophil level, vitamin D level \par Dupixent is a prescription medicine used with other asthma medicines for the maintenance treatment of moderate-to-severe asthma in people aged 12 years and older whose asthma is not controlled with their current asthma medicines. Dupixent helps prevent severe asthma attacks (exacerbations) and can improve your breathing. Dupixent may also help reduce the amount of oral corticosteroids you need while preventing severe asthma attacks and improving your breathing. Dupixent is not used to treat sudden breathing problems. Risks and side effects of Dupixent were discussed and reviewed with patient. \par \par Problem 4: mild KAVYA (NC)\par -s/p home sleep study - mild KAVYA\par   -recommended DD (Chetan Bonner)\par Sleep apnea is associated with adverse clinical consequences which can affect most organ systems.\par Cardiovascular disease risk includes arrhythmias, atrial fibrillation, hypertension, coronary artery disease, and stroke. Metabolic disorders include diabetes type 2, non-alcoholic fatty liver disease. Mood disorder especially depression; and cognitive decline especially in the elderly. Associations with chronic reflux/Rubio’s esophagus some but not all inclusive.\par -Reasons include arousal consistent with hypopnea; respiratory events most prominent in REM sleep or supine position; therefore sleep staging and body position are important for accurate diagnosis and estimation of AHI. \par \par problem 5: cardiac disease\par -recommended to continue to follow up with Cardiologist if needed\par \par problem 6: poor breathing mechanics\par -Proper breathing techniques were reviewed with an emphasis of exhalation. Patient instructed to breath in for 1 second and out for four seconds. Patient was encouraged to not talk while walking. \par \par problem 7:  overweight/ out of shape\par -recommended consultation with Dr. Tiffanie Paz and Dr. Homer Griffin \par -recommended "10-Day Detox Diet" by Dr. Albino Weber \par -Weight loss, exercise, and diet control were discussed and are highly encouraged. Treatment options are given such as, aqua therapy, and contacting a nutritionist. Recommended to use the elliptical, stationary bike, less use of treadmill. \par \par problem 8: health maintenance \par -s/p COVID-19 vaccine x3 \par -Recommended not to get an additional COVID-19 booster at this time until it is updated against the current variants. If planning on travelling, obtaining another booster within 2 weeks of departure is recommended. \par -recommended yearly flu shot after October 15 (s/p 2022)\par -recommended strep pneumonia vaccines: Prevnar-13 vaccine, followed by Pneumo vaccine 23 one year following after 65 years old. \par -recommended early intervention for Upper Respiratory Infections (URIs)\par -recommended regular osteoporosis evaluations\par -recommended early dermatological evaluations\par -recommended after the age of 50 to the age of 70, colonoscopy every 5 years\par \par F/U in 6-8 weeks.\par She is encouraged to call with any changes, concerns, or questions

## 2023-06-21 NOTE — REASON FOR VISIT
[Follow-Up] : a follow-up visit [TextBox_44] : SOB, mild and intermittent asthma , allergies, eczema , (+) mild KAVYA

## 2023-06-21 NOTE — ADDENDUM
[FreeTextEntry1] : Documented by Geovanna Sexton acting as a scribe for Dr. Laureano Mayer on 06/21/2023 .\par \par All medical record entries made by the Scribe were at my, Dr. Laureano Mayer's, direction and personally dictated by me on 06/21/2023. I have reviewed the chart and agree that the record accurately reflects my personal performance of the history, physical exam, assessment and plan. I have also personally directed, reviewed, and agree with the discharge instructions.

## 2023-06-21 NOTE — HISTORY OF PRESENT ILLNESS
[TextBox_4] : Ms. LEOS  is a 28 year old female with a history of ACL tear, allergies, Covid 19, eczema, who now comes to the office for a follow up pulmonary evaluation. Her chief complaint is\par \par -she notes feeling generally well \par -she notes no difficulty swallowing \par -she notes she is getting enough sleep \par -she notes getting around 8 hours of sleep \par -she notes she wakes up at night twice \par -she notes no new meds but she is taking supplements \par -she notes she saw a new physician \par -she notes taking probiotic, fiber, anti oxidants, multi vitamin, vitamin d and appetite reducer \par -she notes she goes [facility]\par -she notes when she is travelling its hard to exercise besides that she stays on top of that \par -she notes her eczema has been bad recently \par -she notes \par \par -she denies any headaches, nausea, emesis, fever, chills, sweats, chest pain, chest pressure, coughing, wheezing, palpitations, constipation, diarrhea, vertigo, dysphagia, heartburn, reflux, itchy eyes, itchy ears, leg swelling, arthralgias, myalgias, or sour taste in the mouth.

## 2023-06-21 NOTE — PROCEDURE
[FreeTextEntry1] : PFT reveals normal flows, with an FEV1 of 2.91 L, which is 106% of predicted, with a normal flow volume loop.\par PFTs were performed to evaluate for SOB / Asthma \par \par FENO was ;54 a normal value being less than 25\par Fractional exhaled nitric oxide (FENO) is regarded as a simple, noninvasive method for assessing eosinophilic airway inflammation. Produced by a variety of cells within the lung, nitric oxide (NO) concentrations are generally low in healthy individuals. However, high concentrations of NO appear to be involved in nonspecific host defense mechanisms and chronic inflammatory diseases such as asthma. The American Thor[employer] Society (ATS) therefore has recommended using FENO to aid in the diagnosis and monitoring of eosinophilic airway inflammation and asthma, and for identifying steroid responsive individuals whose chronic respiratory symptoms may be caused by airway inflammation.

## 2023-07-10 ENCOUNTER — APPOINTMENT (OUTPATIENT)
Dept: INTERNAL MEDICINE | Facility: CLINIC | Age: 28
End: 2023-07-10
Payer: COMMERCIAL

## 2023-07-10 VITALS
DIASTOLIC BLOOD PRESSURE: 88 MMHG | WEIGHT: 186 LBS | TEMPERATURE: 98.8 F | BODY MASS INDEX: 31.93 KG/M2 | OXYGEN SATURATION: 100 % | HEART RATE: 76 BPM | SYSTOLIC BLOOD PRESSURE: 143 MMHG

## 2023-07-10 VITALS — SYSTOLIC BLOOD PRESSURE: 120 MMHG | DIASTOLIC BLOOD PRESSURE: 80 MMHG

## 2023-07-10 DIAGNOSIS — U07.1 COVID-19: ICD-10-CM

## 2023-07-10 DIAGNOSIS — G47.9 SLEEP DISORDER, UNSPECIFIED: ICD-10-CM

## 2023-07-10 DIAGNOSIS — R03.0 ELEVATED BLOOD-PRESSURE READING, W/OUT DIAGNOSIS OF HYPERTENSION: ICD-10-CM

## 2023-07-10 DIAGNOSIS — L30.9 DERMATITIS, UNSPECIFIED: ICD-10-CM

## 2023-07-10 DIAGNOSIS — S89.92XA UNSPECIFIED INJURY OF LEFT LOWER LEG, INITIAL ENCOUNTER: ICD-10-CM

## 2023-07-10 DIAGNOSIS — J45.20 MILD INTERMITTENT ASTHMA, UNCOMPLICATED: ICD-10-CM

## 2023-07-10 DIAGNOSIS — Z76.89 PERSONS ENCOUNTERING HEALTH SERVICES IN OTHER SPECIFIED CIRCUMSTANCES: ICD-10-CM

## 2023-07-10 DIAGNOSIS — Z78.9 OTHER SPECIFIED HEALTH STATUS: ICD-10-CM

## 2023-07-10 DIAGNOSIS — E66.9 OBESITY, UNSPECIFIED: ICD-10-CM

## 2023-07-10 DIAGNOSIS — Z00.00 ENCOUNTER FOR GENERAL ADULT MEDICAL EXAMINATION W/OUT ABNORMAL FINDINGS: ICD-10-CM

## 2023-07-10 DIAGNOSIS — G47.33 OBSTRUCTIVE SLEEP APNEA (ADULT) (PEDIATRIC): ICD-10-CM

## 2023-07-10 DIAGNOSIS — Z87.898 PERSONAL HISTORY OF OTHER SPECIFIED CONDITIONS: ICD-10-CM

## 2023-07-10 DIAGNOSIS — F43.9 REACTION TO SEVERE STRESS, UNSPECIFIED: ICD-10-CM

## 2023-07-10 DIAGNOSIS — J30.9 ALLERGIC RHINITIS, UNSPECIFIED: ICD-10-CM

## 2023-07-10 PROCEDURE — 99214 OFFICE O/P EST MOD 30 MIN: CPT

## 2023-07-10 RX ORDER — ALBUTEROL SULFATE 90 UG/1
108 (90 BASE) INHALANT RESPIRATORY (INHALATION)
Qty: 1 | Refills: 2 | Status: ACTIVE | COMMUNITY
Start: 1900-01-01 | End: 1900-01-01

## 2023-12-19 ENCOUNTER — APPOINTMENT (OUTPATIENT)
Dept: PULMONOLOGY | Facility: CLINIC | Age: 28
End: 2023-12-19

## 2023-12-29 ENCOUNTER — NON-APPOINTMENT (OUTPATIENT)
Age: 28
End: 2023-12-29

## 2024-02-04 ENCOUNTER — NON-APPOINTMENT (OUTPATIENT)
Age: 29
End: 2024-02-04

## 2024-12-17 ENCOUNTER — APPOINTMENT (OUTPATIENT)
Dept: INTERNAL MEDICINE | Facility: CLINIC | Age: 29
End: 2024-12-17
Payer: COMMERCIAL

## 2024-12-17 ENCOUNTER — LABORATORY RESULT (OUTPATIENT)
Age: 29
End: 2024-12-17

## 2024-12-17 VITALS
HEART RATE: 74 BPM | OXYGEN SATURATION: 100 % | WEIGHT: 178 LBS | BODY MASS INDEX: 30.39 KG/M2 | RESPIRATION RATE: 15 BRPM | DIASTOLIC BLOOD PRESSURE: 85 MMHG | HEIGHT: 64 IN | SYSTOLIC BLOOD PRESSURE: 140 MMHG | TEMPERATURE: 98.8 F

## 2024-12-17 DIAGNOSIS — G47.33 OBSTRUCTIVE SLEEP APNEA (ADULT) (PEDIATRIC): ICD-10-CM

## 2024-12-17 DIAGNOSIS — Z11.3 ENCOUNTER FOR SCREENING FOR INFECTIONS WITH A PREDOMINANTLY SEXUAL MODE OF TRANSMISSION: ICD-10-CM

## 2024-12-17 DIAGNOSIS — N92.0 EXCESSIVE AND FREQUENT MENSTRUATION WITH REGULAR CYCLE: ICD-10-CM

## 2024-12-17 DIAGNOSIS — Z78.9 OTHER SPECIFIED HEALTH STATUS: ICD-10-CM

## 2024-12-17 DIAGNOSIS — F43.9 REACTION TO SEVERE STRESS, UNSPECIFIED: ICD-10-CM

## 2024-12-17 DIAGNOSIS — Z23 ENCOUNTER FOR IMMUNIZATION: ICD-10-CM

## 2024-12-17 DIAGNOSIS — Z87.2 PERSONAL HISTORY OF DISEASES OF THE SKIN AND SUBCUTANEOUS TISSUE: ICD-10-CM

## 2024-12-17 DIAGNOSIS — E66.811 OBESITY, CLASS 1: ICD-10-CM

## 2024-12-17 DIAGNOSIS — G47.9 SLEEP DISORDER, UNSPECIFIED: ICD-10-CM

## 2024-12-17 DIAGNOSIS — J45.20 MILD INTERMITTENT ASTHMA, UNCOMPLICATED: ICD-10-CM

## 2024-12-17 DIAGNOSIS — F41.1 GENERALIZED ANXIETY DISORDER: ICD-10-CM

## 2024-12-17 DIAGNOSIS — Z00.00 ENCOUNTER FOR GENERAL ADULT MEDICAL EXAMINATION W/OUT ABNORMAL FINDINGS: ICD-10-CM

## 2024-12-17 DIAGNOSIS — H54.7 UNSPECIFIED VISUAL LOSS: ICD-10-CM

## 2024-12-17 DIAGNOSIS — E55.9 VITAMIN D DEFICIENCY, UNSPECIFIED: ICD-10-CM

## 2024-12-17 PROCEDURE — 99395 PREV VISIT EST AGE 18-39: CPT | Mod: 25

## 2024-12-17 PROCEDURE — 90656 IIV3 VACC NO PRSV 0.5 ML IM: CPT

## 2024-12-17 PROCEDURE — 90471 IMMUNIZATION ADMIN: CPT

## 2024-12-17 PROCEDURE — 36415 COLL VENOUS BLD VENIPUNCTURE: CPT

## 2024-12-17 PROCEDURE — 99214 OFFICE O/P EST MOD 30 MIN: CPT | Mod: 25

## 2024-12-17 RX ORDER — DIPHENHYDRAMINE HYDROCHLORIDE 25 MG/1
25 TABLET, FILM COATED ORAL
Refills: 0 | Status: ACTIVE | COMMUNITY

## 2024-12-17 RX ORDER — BUSPIRONE HYDROCHLORIDE 5 MG/1
5 TABLET ORAL 3 TIMES DAILY
Qty: 90 | Refills: 0 | Status: ACTIVE | COMMUNITY
Start: 2024-12-17 | End: 1900-01-01

## 2024-12-18 ENCOUNTER — TRANSCRIPTION ENCOUNTER (OUTPATIENT)
Age: 29
End: 2024-12-18

## 2024-12-20 DIAGNOSIS — B00.9 HERPESVIRAL INFECTION, UNSPECIFIED: ICD-10-CM

## 2024-12-20 LAB
25(OH)D3 SERPL-MCNC: 16.2 NG/ML
ALBUMIN SERPL ELPH-MCNC: 4.4 G/DL
ALP BLD-CCNC: 50 U/L
ALT SERPL-CCNC: 14 U/L
ANION GAP SERPL CALC-SCNC: 13 MMOL/L
APPEARANCE: CLEAR
AST SERPL-CCNC: 14 U/L
BASOPHILS # BLD AUTO: 0.02 K/UL
BASOPHILS NFR BLD AUTO: 0.3 %
BILIRUB SERPL-MCNC: 0.3 MG/DL
BILIRUBIN URINE: NEGATIVE
BLOOD URINE: NEGATIVE
BUN SERPL-MCNC: 8 MG/DL
C TRACH RRNA SPEC QL NAA+PROBE: NOT DETECTED
CALCIUM SERPL-MCNC: 9.7 MG/DL
CHLORIDE SERPL-SCNC: 104 MMOL/L
CHOLEST SERPL-MCNC: 180 MG/DL
CO2 SERPL-SCNC: 24 MMOL/L
COLOR: YELLOW
CREAT SERPL-MCNC: 0.64 MG/DL
EGFR: 123 ML/MIN/1.73M2
EOSINOPHIL # BLD AUTO: 0.1 K/UL
EOSINOPHIL NFR BLD AUTO: 1.3 %
ESTIMATED AVERAGE GLUCOSE: 120 MG/DL
FERRITIN SERPL-MCNC: 15 NG/ML
FOLATE SERPL-MCNC: 6.8 NG/ML
GLUCOSE QUALITATIVE U: NEGATIVE MG/DL
GLUCOSE SERPL-MCNC: 107 MG/DL
HBA1C MFR BLD HPLC: 5.8 %
HCT VFR BLD CALC: 39 %
HDLC SERPL-MCNC: 54 MG/DL
HGB BLD-MCNC: 12.2 G/DL
HIV1+2 AB SPEC QL IA.RAPID: NONREACTIVE
HSV 1+2 IGG SER IA-IMP: NEGATIVE
HSV 1+2 IGG SER IA-IMP: POSITIVE
HSV1 IGG SER QL: 13.8 INDEX
HSV2 IGG SER QL: 0.04 INDEX
IMM GRANULOCYTES NFR BLD AUTO: 0.3 %
IRON SERPL-MCNC: 46 UG/DL
KETONES URINE: NEGATIVE MG/DL
LDLC SERPL CALC-MCNC: 107 MG/DL
LEUKOCYTE ESTERASE URINE: ABNORMAL
LYMPHOCYTES # BLD AUTO: 2.43 K/UL
LYMPHOCYTES NFR BLD AUTO: 30.7 %
MAN DIFF?: NORMAL
MCHC RBC-ENTMCNC: 29.6 PG
MCHC RBC-ENTMCNC: 31.3 G/DL
MCV RBC AUTO: 94.7 FL
MONOCYTES # BLD AUTO: 0.41 K/UL
MONOCYTES NFR BLD AUTO: 5.2 %
N GONORRHOEA RRNA SPEC QL NAA+PROBE: NOT DETECTED
NEUTROPHILS # BLD AUTO: 4.93 K/UL
NEUTROPHILS NFR BLD AUTO: 62.2 %
NITRITE URINE: NEGATIVE
NONHDLC SERPL-MCNC: 126 MG/DL
PH URINE: 7
PLATELET # BLD AUTO: 307 K/UL
POTASSIUM SERPL-SCNC: 4.7 MMOL/L
PROT SERPL-MCNC: 7.5 G/DL
PROTEIN URINE: NEGATIVE MG/DL
RBC # BLD: 4.12 M/UL
RBC # FLD: 14.8 %
SODIUM SERPL-SCNC: 141 MMOL/L
SOURCE AMPLIFICATION: NORMAL
SPECIFIC GRAVITY URINE: 1.01
T PALLIDUM AB SER QL IA: NEGATIVE
TRIGL SERPL-MCNC: 106 MG/DL
TSH SERPL-ACNC: 1.97 UIU/ML
UROBILINOGEN URINE: 0.2 MG/DL
VIT B12 SERPL-MCNC: 400 PG/ML
WBC # FLD AUTO: 7.91 K/UL

## 2024-12-20 RX ORDER — ERGOCALCIFEROL 1.25 MG/1
1.25 MG CAPSULE ORAL
Qty: 13 | Refills: 1 | Status: ACTIVE | COMMUNITY
Start: 2024-12-20 | End: 1900-01-01

## 2024-12-23 ENCOUNTER — APPOINTMENT (OUTPATIENT)
Dept: INTERNAL MEDICINE | Facility: CLINIC | Age: 29
End: 2024-12-23

## 2025-04-24 ENCOUNTER — APPOINTMENT (OUTPATIENT)
Dept: INTERNAL MEDICINE | Facility: CLINIC | Age: 30
End: 2025-04-24
Payer: COMMERCIAL

## 2025-04-24 DIAGNOSIS — Z97.5 PRESENCE OF (INTRAUTERINE) CONTRACEPTIVE DEVICE: ICD-10-CM

## 2025-04-24 DIAGNOSIS — R00.2 PALPITATIONS: ICD-10-CM

## 2025-04-24 DIAGNOSIS — E66.811 OBESITY, CLASS 1: ICD-10-CM

## 2025-04-24 DIAGNOSIS — G47.33 OBSTRUCTIVE SLEEP APNEA (ADULT) (PEDIATRIC): ICD-10-CM

## 2025-04-24 DIAGNOSIS — F43.9 REACTION TO SEVERE STRESS, UNSPECIFIED: ICD-10-CM

## 2025-04-24 DIAGNOSIS — E55.9 VITAMIN D DEFICIENCY, UNSPECIFIED: ICD-10-CM

## 2025-04-24 DIAGNOSIS — Z23 ENCOUNTER FOR IMMUNIZATION: ICD-10-CM

## 2025-04-24 DIAGNOSIS — R73.03 PREDIABETES.: ICD-10-CM

## 2025-04-24 DIAGNOSIS — F41.0 PANIC DISORDER [EPISODIC PAROXYSMAL ANXIETY]: ICD-10-CM

## 2025-04-24 DIAGNOSIS — F41.1 GENERALIZED ANXIETY DISORDER: ICD-10-CM

## 2025-04-24 DIAGNOSIS — N92.0 EXCESSIVE AND FREQUENT MENSTRUATION WITH REGULAR CYCLE: ICD-10-CM

## 2025-04-24 DIAGNOSIS — G47.9 SLEEP DISORDER, UNSPECIFIED: ICD-10-CM

## 2025-04-24 DIAGNOSIS — J45.20 MILD INTERMITTENT ASTHMA, UNCOMPLICATED: ICD-10-CM

## 2025-04-24 PROCEDURE — G2211 COMPLEX E/M VISIT ADD ON: CPT | Mod: NC

## 2025-04-24 PROCEDURE — 36415 COLL VENOUS BLD VENIPUNCTURE: CPT

## 2025-04-24 PROCEDURE — 99214 OFFICE O/P EST MOD 30 MIN: CPT

## 2025-04-24 RX ORDER — UBIDECARENONE 50 MG
CAPSULE ORAL
Refills: 0 | Status: ACTIVE | COMMUNITY

## 2025-04-24 RX ORDER — PROPRANOLOL HYDROCHLORIDE 60 MG/1
60 CAPSULE, EXTENDED RELEASE ORAL
Qty: 90 | Refills: 0 | Status: ACTIVE | COMMUNITY
Start: 2025-04-24 | End: 1900-01-01

## 2025-04-24 RX ORDER — MULTIVIT-MIN/IRON/FOLIC ACID/K 18-600-40
CAPSULE ORAL
Refills: 0 | Status: ACTIVE | COMMUNITY

## 2025-04-25 PROBLEM — Z97.5 IUD CONTRACEPTION: Status: ACTIVE | Noted: 2025-04-24

## 2025-04-25 PROBLEM — R73.03 PREDIABETES: Status: ACTIVE | Noted: 2025-04-24

## 2025-04-25 PROBLEM — R00.2 PALPITATIONS: Status: ACTIVE | Noted: 2025-04-25

## 2025-04-25 PROBLEM — F41.0 PANIC ATTACKS: Status: ACTIVE | Noted: 2025-04-25

## 2025-04-30 ENCOUNTER — TRANSCRIPTION ENCOUNTER (OUTPATIENT)
Age: 30
End: 2025-04-30

## 2025-04-30 LAB
25(OH)D3 SERPL-MCNC: 24.8 NG/ML
ALBUMIN SERPL ELPH-MCNC: 4.4 G/DL
ALP BLD-CCNC: 44 U/L
ALT SERPL-CCNC: 19 U/L
ANION GAP SERPL CALC-SCNC: 13 MMOL/L
AST SERPL-CCNC: 18 U/L
BASOPHILS # BLD AUTO: 0.03 K/UL
BASOPHILS NFR BLD AUTO: 0.4 %
BILIRUB SERPL-MCNC: 0.4 MG/DL
BUN SERPL-MCNC: 9 MG/DL
CALCIUM SERPL-MCNC: 9.4 MG/DL
CHLORIDE SERPL-SCNC: 104 MMOL/L
CHOLEST SERPL-MCNC: 180 MG/DL
CO2 SERPL-SCNC: 23 MMOL/L
CREAT SERPL-MCNC: 0.69 MG/DL
EGFRCR SERPLBLD CKD-EPI 2021: 120 ML/MIN/1.73M2
EOSINOPHIL # BLD AUTO: 0.18 K/UL
EOSINOPHIL NFR BLD AUTO: 2.7 %
ESTIMATED AVERAGE GLUCOSE: 128 MG/DL
FERRITIN SERPL-MCNC: 15 NG/ML
FOLATE SERPL-MCNC: 10.3 NG/ML
GLUCOSE SERPL-MCNC: 86 MG/DL
HBA1C MFR BLD HPLC: 6.1 %
HCT VFR BLD CALC: 39 %
HDLC SERPL-MCNC: 47 MG/DL
HGB BLD-MCNC: 12.2 G/DL
IMM GRANULOCYTES NFR BLD AUTO: 0.3 %
IRON SERPL-MCNC: 138 UG/DL
LDLC SERPL-MCNC: 112 MG/DL
LYMPHOCYTES # BLD AUTO: 2.76 K/UL
LYMPHOCYTES NFR BLD AUTO: 41.1 %
MAN DIFF?: NORMAL
MCHC RBC-ENTMCNC: 29.4 PG
MCHC RBC-ENTMCNC: 31.3 G/DL
MCV RBC AUTO: 94 FL
MONOCYTES # BLD AUTO: 0.43 K/UL
MONOCYTES NFR BLD AUTO: 6.4 %
NEUTROPHILS # BLD AUTO: 3.29 K/UL
NEUTROPHILS NFR BLD AUTO: 49.1 %
NONHDLC SERPL-MCNC: 132 MG/DL
PLATELET # BLD AUTO: 297 K/UL
POTASSIUM SERPL-SCNC: 4.9 MMOL/L
PROT SERPL-MCNC: 7.2 G/DL
RBC # BLD: 4.15 M/UL
RBC # FLD: 14.7 %
SODIUM SERPL-SCNC: 141 MMOL/L
TRIGL SERPL-MCNC: 110 MG/DL
VIT B12 SERPL-MCNC: 297 PG/ML
WBC # FLD AUTO: 6.71 K/UL